# Patient Record
Sex: FEMALE | Race: OTHER | URBAN - METROPOLITAN AREA
[De-identification: names, ages, dates, MRNs, and addresses within clinical notes are randomized per-mention and may not be internally consistent; named-entity substitution may affect disease eponyms.]

---
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
## 2023-07-31 NOTE — PROGRESS NOTES
Assessment:  1. Contusion of right middle finger without damage to nail, initial encounter  Ambulatory Referral to PT/OT Hand Therapy        Patient Active Problem List   Diagnosis   • Head congestion   • Chronic rhinitis   • Contusion of right middle finger without damage to nail       Plan      · Stack splint of right long finger  · Follow-up in 3 weeks and obtain repeat X-rays possibly discharge the brace and allow return to normal activity. Subjective:    Patient ID: Rosaura Raymond 70 y.o. female    Chief Complaint: Initial evaluation of right long finger    HPI    Patient comes in today with regards to injury of the right long finger. The patient's fingers was caught between a table and another object at work on 7/26/2023. The patient reports that the pain is in the right long finger and has been going on for 5 days. The pain is rated at 5/10 at its best and 7/10 at its worst.  The pain is described as aching but was sharp at the time of injury. It is worsened when working with the hands, and is made better with rest.  The patient has not taken any pain medication for treatment. They present today with a rigid extension splint of the right long finger. The following portions of the patient's history were reviewed and updated as appropriate: allergies, current medications, past family history, past social history, past surgical history and problem list.        Social History     Socioeconomic History   • Marital status: /Civil Union     Spouse name: Not on file   • Number of children: Not on file   • Years of education: Not on file   • Highest education level: Not on file   Occupational History   • Not on file   Tobacco Use   • Smoking status: Every Day     Packs/day: 0.50     Years: 35.00     Total pack years: 17.50     Types: Cigarettes   • Smokeless tobacco: Never   Vaping Use   • Vaping Use: Never used   Substance and Sexual Activity   • Alcohol use:  Yes     Alcohol/week: 6.0 Subjective    Chief Complaint   Patient presents with    Back Pain     lumbar pain pain for a while, progressively gotten worse over the last 3 weeks       Daniel Jaquez is a 14 y.o. female who presents today for lower back pain. The patient reports that she has had this pain on and off for several years. It appears that she was seen here in after-hours for lower back pain in 2015. She denies a specific mechanism of injury. However over the past 3 to 4 weeks, the pain has worsened and has started radiating down her legs. She is also having pain to the left anterior hip and groin area. She does have numbness and tingling radiating down both legs as well. She denies any incontinence. She has pain with sitting, standing as well as ambulating. She does not have pain with laying down. She has been taking Aleve as well as Mobic and using ice and heat with no relief. She has been going to a chiropractor who is been doing stimulation as well as massages and adjusting with no relief. She is in 12th grade at [employer]. She participates in theater. She is here today with her mother. Pain Assessment  Location of Pain: Back  Severity of Pain: 6  Quality of Pain: Other (Comment)  Duration of Pain: Other (Comment)  Frequency of Pain: Other (Comment)    Patient's medications, allergies, past medical, surgical, social and family histories were reviewed and updated as appropriate. Physical Exam  Constitutional:  Pt well groomed, no acute distress, well developed, no obvious deformities  Vitals:    02/20/21 1142   Weight: 160 lb (72.6 kg)   Height: 5' 4\" (1.626 m)     -Oriented to person, place, and time  -mood and affect are appropriate    Lumbar Exam:  she  is well-developed, well-nourished, oriented to person, place, and time. her mood and affect are appropriate.     -her gait is Antalgic.    -Motor strength is 5/5 throughout both lower extremities.     -Lumbar range of motion is limited in flexion secondary to pain, no pain with extension.    -she is not tender to palpation over the lower lumbar vertebrae, tenderness with palpation over the region of bilateral sacroiliac joints. Some noted tenderness with palpation over the region of the left hip flexor  -There is decrease of the normal lumbar lordosis secondary to pain. Neurological:  -Deep tendon reflexes at knee and ankle are symmetric bilaterally.    -Blurry, double visionis not noted. -NVI to lower extremities bilaterally. Skin:  No abrasions, lesions, cellulitic changes, obvious deformities noted     Xray: 2 view of the lumbar spine obtained today shows: No acute fractures or deformities noted; lumbar disc space well-maintained; decreased lordosis noted on lateral view    Assessment: Lumbar strain with radiculopathy, bilateral sacroiliitis    Plan: Given that the patient has been pursuing conservative treatment for several weeks, she is requesting an MRI at this time. An MRI was ordered to rule out spondylolisthesis. The patient was given a prescription for Voltaren. She was also advised to try over-the-counter Voltaren gel. She will try ice over the area as well. She was given an order for formal physical therapy. She will follow up with our spine team after her MRI. No orders of the defined types were placed in this encounter. standard drinks of alcohol     Types: 6 Cans of beer per week     Comment: 6-12 pack per day   • Drug use: Yes     Types: Marijuana   • Sexual activity: Not on file   Other Topics Concern   • Not on file   Social History Narrative   • Not on file     Social Determinants of Health     Financial Resource Strain: Not on file   Food Insecurity: Not on file   Transportation Needs: Not on file   Physical Activity: Not on file   Stress: Not on file   Social Connections: Not on file   Intimate Partner Violence: Not on file   Housing Stability: Not on file     Past Medical History:   Diagnosis Date   • Allergic      History reviewed. No pertinent surgical history. No Known Allergies  Current Outpatient Medications on File Prior to Visit   Medication Sig Dispense Refill   • benzonatate (TESSALON) 200 MG capsule Take 1 capsule (200 mg total) by mouth 3 (three) times a day as needed for cough 20 capsule 0   • fluticasone (FLONASE) 50 mcg/act nasal spray SPRAY 2 SPRAYS INTO EACH NOSTRIL EVERY DAY 48 mL 0   • ibuprofen (MOTRIN) 600 mg tablet TAKE 1 TABLET BY MOUTH THREE TIMES A DAY WITH FOOD OR MILK AS NEEDED FOR PAIN     • methylPREDNISolone 4 MG tablet therapy pack Use as directed on package 21 each 0     No current facility-administered medications on file prior to visit. Objective:    Review of Systems   Constitutional: Negative for chills and fever. HENT: Negative for ear pain and sore throat. Eyes: Negative for pain and visual disturbance. Respiratory: Negative for cough and shortness of breath. Cardiovascular: Negative for chest pain and palpitations. Gastrointestinal: Negative for abdominal pain and vomiting. Genitourinary: Negative for dysuria and hematuria. Musculoskeletal: Negative for arthralgias and back pain. Skin: Negative for color change and rash. Neurological: Negative for seizures and syncope. All other systems reviewed and are negative.       Right Hand Exam     Tenderness Right hand tenderness location: Right PIP of long finger. Range of Motion   The patient has normal right wrist ROM. Hand   MP Middle: normal   PIP Middle: normal   DIP Middle: normal     Other   Erythema: absent  Scars: absent  Sensation: normal  Pulse: present    Comments:  Moderate swelling of the right long finger  Ecchymosis present  Flexor profundus and superficialis tendons in tact  Extensor tendon in tact            Physical Exam  Vitals and nursing note reviewed. Constitutional:       Appearance: Normal appearance. HENT:      Head: Normocephalic and atraumatic. Right Ear: External ear normal.      Left Ear: External ear normal.      Nose: Nose normal.   Eyes:      General: No scleral icterus. Extraocular Movements: Extraocular movements intact. Conjunctiva/sclera: Conjunctivae normal.   Cardiovascular:      Rate and Rhythm: Normal rate. Pulmonary:      Effort: Pulmonary effort is normal. No respiratory distress. Musculoskeletal:      Cervical back: Normal range of motion and neck supple. Comments: See ortho exam   Skin:     General: Skin is warm and dry. Neurological:      Mental Status: She is alert and oriented to person, place, and time. Psychiatric:         Mood and Affect: Mood normal.         Behavior: Behavior normal.         Procedures  No Procedures performed today    I have personally reviewed pertinent films in PACS and my interpretation is No osseous fracture noted at this time but does have arthritis in multiple joints. .    Scribe Attestation    I,:  Kelechi Cruz am acting as a scribe while in the presence of the attending physician.:       I,:  Carlos Suarez, DO personally performed the services described in this documentation    as scribed in my presence.:         Portions of the record may have been created with voice recognition software.  Occasional wrong word or "sound a like" substitutions may have occurred due to the inherent limitations of voice recognition software.  Read the chart carefully and recognize, using context, where substitutions have occurred.